# Patient Record
Sex: FEMALE | Race: WHITE | Employment: FULL TIME | URBAN - METROPOLITAN AREA
[De-identification: names, ages, dates, MRNs, and addresses within clinical notes are randomized per-mention and may not be internally consistent; named-entity substitution may affect disease eponyms.]

---

## 2018-08-02 ENCOUNTER — OFFICE VISIT (OUTPATIENT)
Dept: INTERNAL MEDICINE | Age: 38
End: 2018-08-02
Payer: COMMERCIAL

## 2018-08-02 VITALS
SYSTOLIC BLOOD PRESSURE: 102 MMHG | DIASTOLIC BLOOD PRESSURE: 70 MMHG | WEIGHT: 146.6 LBS | HEART RATE: 91 BPM | OXYGEN SATURATION: 98 % | BODY MASS INDEX: 23.01 KG/M2 | HEIGHT: 67 IN

## 2018-08-02 DIAGNOSIS — B96.89 BV (BACTERIAL VAGINOSIS): Primary | ICD-10-CM

## 2018-08-02 DIAGNOSIS — J30.89 ENVIRONMENTAL AND SEASONAL ALLERGIES: ICD-10-CM

## 2018-08-02 DIAGNOSIS — N76.0 BV (BACTERIAL VAGINOSIS): Primary | ICD-10-CM

## 2018-08-02 DIAGNOSIS — E03.9 HYPOTHYROIDISM, UNSPECIFIED TYPE: ICD-10-CM

## 2018-08-02 PROCEDURE — 99203 OFFICE O/P NEW LOW 30 MIN: CPT | Performed by: PHYSICIAN ASSISTANT

## 2018-08-02 RX ORDER — LEVOTHYROXINE SODIUM 88 UG/1
88 TABLET ORAL DAILY
COMMUNITY
End: 2019-02-08 | Stop reason: SDUPTHER

## 2018-08-02 RX ORDER — CLINDAMYCIN PHOSPHATE 20 MG/G
CREAM VAGINAL
Qty: 1 TUBE | Refills: 0 | Status: SHIPPED | OUTPATIENT
Start: 2018-08-02 | End: 2018-11-15 | Stop reason: SDUPTHER

## 2018-08-02 ASSESSMENT — ENCOUNTER SYMPTOMS
COUGH: 0
SHORTNESS OF BREATH: 0
ABDOMINAL PAIN: 0

## 2018-08-02 ASSESSMENT — PATIENT HEALTH QUESTIONNAIRE - PHQ9
1. LITTLE INTEREST OR PLEASURE IN DOING THINGS: 0
SUM OF ALL RESPONSES TO PHQ QUESTIONS 1-9: 0
SUM OF ALL RESPONSES TO PHQ9 QUESTIONS 1 & 2: 0
2. FEELING DOWN, DEPRESSED OR HOPELESS: 0

## 2018-08-02 NOTE — PROGRESS NOTES
record. OBJECTIVE    Vitals:    08/02/18 0845   BP: 102/70   Pulse: 91   SpO2: 98%   Weight: 146 lb 9.6 oz (66.5 kg)   Height: 5' 7\" (1.702 m)       Physical Exam   Constitutional: She is well-developed, well-nourished, and in no distress. HENT:   Head: Normocephalic. Mouth/Throat: Oropharynx is clear and moist.   Eyes: Conjunctivae are normal.   Neck: Normal range of motion. Neck supple. No thyromegaly present. Cardiovascular: Normal rate, regular rhythm and normal heart sounds. Pulmonary/Chest: Effort normal and breath sounds normal.   Abdominal: Soft. Lymphadenopathy:     She has no cervical adenopathy. Neurological: She is alert. Skin: Skin is warm. Psychiatric: Affect normal.         ASSESSMENT/ PLAN    1. BV (bacterial vaginosis)  - will treat today with ABX  - needs to see gyn, for to discuss options for recurrent treatment   - pt wants to discuss the intravaginal boric acid   - Ambulatory referral to Obstetrics / Gynecology  - clindamycin (CLEOCIN) 2 % vaginal cream; Place vaginally nightly x 7 days  Dispense: 1 Tube; Refill: 0    2. Hypothyroidism, unspecified type  - stale on current treatment dose  - continue Synthroid 88 mcg     3.  Environmental and seasonal allergies  - Amb External Referral To Allergy                Electronically signed by:  IBRAHIMA Blanton   8/8/18

## 2018-08-02 NOTE — LETTER
[facility]  [address] [place], [place]        August 2, 2018    [address]  [address] [place] [address]      Dear Carol Wall:                  During your last visit to our office, we recommended a consultation with an allergist.           If you have not already scheduled this please call: [phone] to make that appointment. Dr. Matilde Edmond           Please remember the importance of your health and take time to consult a specialist. If you do not wish to follow through with the consultation, please contact us so we can discuss this matter with you.                   Sincerely,                  IBRAHIMA Bunn

## 2018-08-21 ENCOUNTER — TELEPHONE (OUTPATIENT)
Dept: INTERNAL MEDICINE | Age: 38
End: 2018-08-21

## 2018-08-28 ENCOUNTER — NURSE ONLY (OUTPATIENT)
Dept: INTERNAL MEDICINE | Age: 38
End: 2018-08-28

## 2018-08-28 DIAGNOSIS — R89.4 NONSPECIFIC IMMUNOLOGIC FINDING: Primary | ICD-10-CM

## 2018-08-28 NOTE — PROGRESS NOTES
Pt was here for first set of allergy shots. Pt supplied vials and is leaving them here for her next dose. Pt tolerated well.

## 2018-08-30 ENCOUNTER — OFFICE VISIT (OUTPATIENT)
Dept: OBGYN CLINIC | Age: 38
End: 2018-08-30
Payer: COMMERCIAL

## 2018-08-30 VITALS
DIASTOLIC BLOOD PRESSURE: 66 MMHG | HEART RATE: 76 BPM | WEIGHT: 148 LBS | BODY MASS INDEX: 23.23 KG/M2 | SYSTOLIC BLOOD PRESSURE: 104 MMHG | HEIGHT: 67 IN

## 2018-08-30 DIAGNOSIS — N76.0 VAGINOSIS: Primary | ICD-10-CM

## 2018-08-30 PROCEDURE — 99203 OFFICE O/P NEW LOW 30 MIN: CPT | Performed by: OBSTETRICS & GYNECOLOGY

## 2018-09-03 ASSESSMENT — ENCOUNTER SYMPTOMS
ABDOMINAL PAIN: 0
DIARRHEA: 0
CONSTIPATION: 0

## 2018-09-04 ENCOUNTER — OFFICE VISIT (OUTPATIENT)
Dept: INTERNAL MEDICINE | Age: 38
End: 2018-09-04
Payer: COMMERCIAL

## 2018-09-04 ENCOUNTER — NURSE ONLY (OUTPATIENT)
Dept: INTERNAL MEDICINE | Age: 38
End: 2018-09-04

## 2018-09-04 VITALS
RESPIRATION RATE: 16 BRPM | BODY MASS INDEX: 23.89 KG/M2 | HEART RATE: 61 BPM | DIASTOLIC BLOOD PRESSURE: 70 MMHG | TEMPERATURE: 97.8 F | SYSTOLIC BLOOD PRESSURE: 102 MMHG | OXYGEN SATURATION: 99 % | HEIGHT: 67 IN | WEIGHT: 152.2 LBS

## 2018-09-04 DIAGNOSIS — L30.9 DERMATITIS: Primary | ICD-10-CM

## 2018-09-04 DIAGNOSIS — R89.4 NONSPECIFIC IMMUNOLOGIC FINDING: Primary | ICD-10-CM

## 2018-09-04 PROCEDURE — 99213 OFFICE O/P EST LOW 20 MIN: CPT | Performed by: PHYSICIAN ASSISTANT

## 2018-09-04 RX ORDER — PERMETHRIN 50 MG/G
CREAM TOPICAL
Qty: 1 TUBE | Refills: 0 | Status: SHIPPED | OUTPATIENT
Start: 2018-09-04 | End: 2019-04-04 | Stop reason: ALTCHOICE

## 2018-09-04 RX ORDER — METHYLPREDNISOLONE 4 MG/1
TABLET ORAL
Qty: 1 KIT | Refills: 0 | Status: SHIPPED | OUTPATIENT
Start: 2018-09-04 | End: 2018-09-10

## 2018-09-04 NOTE — PROGRESS NOTES
2018     Bruno Nguyễn (:  1980) is a 41 y.o. female, here for evaluation of the following medical concerns:    Chief Complaint   Patient presents with    Rash     Pt  presents with rash all over body stomach, back, legs and arms. Onset x4 days pt is having itchiness, redness and small bumps. Pt does not know what the rash is from pt has not switched anything in daily life. Pt states her  has the same rash       Rash   This is a new problem. Episode onset: 4 days. The problem has been gradually worsening since onset. The rash is diffuse. The rash is characterized by redness and itchiness. She was exposed to nothing. Pertinent negatives include no cough, facial edema, fatigue, fever, shortness of breath or sore throat. Past treatments include nothing.    has the same rash, thinks that he was exposed to scabies at work         Review of Systems   Constitutional: Negative for chills, fatigue and fever. HENT: Negative for sore throat. Respiratory: Negative for cough and shortness of breath. Musculoskeletal: Negative for arthralgias. Skin: Positive for rash. Prior to Visit Medications    Medication Sig Taking? Authorizing Provider   permethrin (ELIMITE) 5 % cream Apply topically as directed Yes IBRAHIMA Yeager   levothyroxine (SYNTHROID) 88 MCG tablet Take 88 mcg by mouth Daily Yes Historical Provider, MD        Social History   Substance Use Topics    Smoking status: Never Smoker    Smokeless tobacco: Never Used    Alcohol use Yes      Comment: OCCASSIONAL        Vitals:    18 1220   BP: 102/70   Site: Left Arm   Position: Sitting   Cuff Size: Medium Adult   Pulse: 61   Resp: 16   Temp: 97.8 °F (36.6 °C)   TempSrc: Oral   SpO2: 99%   Weight: 152 lb 3.2 oz (69 kg)   Height: 5' 7\" (1.702 m)     Estimated body mass index is 23.84 kg/m² as calculated from the following:    Height as of this encounter: 5' 7\" (1.702 m).     Weight as of this encounter: 152 lb 3.2 oz (69

## 2018-09-11 ENCOUNTER — NURSE ONLY (OUTPATIENT)
Dept: INTERNAL MEDICINE | Age: 38
End: 2018-09-11

## 2018-09-11 DIAGNOSIS — R89.4 NONSPECIFIC IMMUNOLOGIC FINDING: Primary | ICD-10-CM

## 2018-09-16 ASSESSMENT — ENCOUNTER SYMPTOMS
SORE THROAT: 0
COUGH: 0
SHORTNESS OF BREATH: 0

## 2018-09-18 ENCOUNTER — NURSE ONLY (OUTPATIENT)
Dept: INTERNAL MEDICINE | Age: 38
End: 2018-09-18

## 2018-09-18 DIAGNOSIS — R89.4 NONSPECIFIC IMMUNOLOGIC FINDING: Primary | ICD-10-CM

## 2018-09-25 ENCOUNTER — NURSE ONLY (OUTPATIENT)
Dept: INTERNAL MEDICINE | Age: 38
End: 2018-09-25

## 2018-09-25 DIAGNOSIS — R89.4 NONSPECIFIC IMMUNOLOGIC FINDING: Primary | ICD-10-CM

## 2018-10-02 ENCOUNTER — NURSE ONLY (OUTPATIENT)
Dept: INTERNAL MEDICINE | Age: 38
End: 2018-10-02

## 2018-10-02 DIAGNOSIS — R89.4 NONSPECIFIC IMMUNOLOGIC FINDING: Primary | ICD-10-CM

## 2018-10-16 ENCOUNTER — NURSE ONLY (OUTPATIENT)
Dept: INTERNAL MEDICINE | Age: 38
End: 2018-10-16

## 2018-10-16 DIAGNOSIS — R89.4 NONSPECIFIC IMMUNOLOGIC FINDING: Primary | ICD-10-CM

## 2018-10-23 ENCOUNTER — NURSE ONLY (OUTPATIENT)
Dept: INTERNAL MEDICINE | Age: 38
End: 2018-10-23

## 2018-10-23 DIAGNOSIS — R89.4 NONSPECIFIC IMMUNOLOGIC FINDING: Primary | ICD-10-CM

## 2018-10-25 ENCOUNTER — OFFICE VISIT (OUTPATIENT)
Dept: INTERNAL MEDICINE | Age: 38
End: 2018-10-25
Payer: COMMERCIAL

## 2018-10-25 VITALS
SYSTOLIC BLOOD PRESSURE: 100 MMHG | BODY MASS INDEX: 24.14 KG/M2 | DIASTOLIC BLOOD PRESSURE: 60 MMHG | HEIGHT: 67 IN | WEIGHT: 153.8 LBS | OXYGEN SATURATION: 98 % | HEART RATE: 66 BPM | TEMPERATURE: 97 F

## 2018-10-25 DIAGNOSIS — E03.9 HYPOTHYROIDISM, UNSPECIFIED TYPE: ICD-10-CM

## 2018-10-25 DIAGNOSIS — N89.8 VAGINAL DISCHARGE: ICD-10-CM

## 2018-10-25 DIAGNOSIS — Z01.419 WELL WOMAN EXAM WITH ROUTINE GYNECOLOGICAL EXAM: ICD-10-CM

## 2018-10-25 DIAGNOSIS — Z12.4 SCREENING FOR CERVICAL CANCER: ICD-10-CM

## 2018-10-25 DIAGNOSIS — Z01.419 WELL WOMAN EXAM WITH ROUTINE GYNECOLOGICAL EXAM: Primary | ICD-10-CM

## 2018-10-25 LAB
ALBUMIN SERPL-MCNC: 4.4 G/DL (ref 3.9–4.9)
ALP BLD-CCNC: 68 U/L (ref 40–130)
ALT SERPL-CCNC: 9 U/L (ref 0–33)
ANION GAP SERPL CALCULATED.3IONS-SCNC: 12 MEQ/L (ref 7–13)
AST SERPL-CCNC: 15 U/L (ref 0–35)
BASOPHILS ABSOLUTE: 0 K/UL (ref 0–0.2)
BASOPHILS RELATIVE PERCENT: 0.9 %
BILIRUB SERPL-MCNC: 0.7 MG/DL (ref 0–1.2)
BUN BLDV-MCNC: 8 MG/DL (ref 6–20)
CALCIUM SERPL-MCNC: 9.3 MG/DL (ref 8.6–10.2)
CHLORIDE BLD-SCNC: 100 MEQ/L (ref 98–107)
CO2: 26 MEQ/L (ref 22–29)
CREAT SERPL-MCNC: 0.46 MG/DL (ref 0.5–0.9)
EOSINOPHILS ABSOLUTE: 0.1 K/UL (ref 0–0.7)
EOSINOPHILS RELATIVE PERCENT: 1.2 %
GFR AFRICAN AMERICAN: >60
GFR NON-AFRICAN AMERICAN: >60
GLOBULIN: 2.4 G/DL (ref 2.3–3.5)
GLUCOSE BLD-MCNC: 108 MG/DL (ref 74–109)
HCT VFR BLD CALC: 39.1 % (ref 37–47)
HEMOGLOBIN: 13.2 G/DL (ref 12–16)
LYMPHOCYTES ABSOLUTE: 1.5 K/UL (ref 1–4.8)
LYMPHOCYTES RELATIVE PERCENT: 29.6 %
MCH RBC QN AUTO: 29.6 PG (ref 27–31.3)
MCHC RBC AUTO-ENTMCNC: 33.7 % (ref 33–37)
MCV RBC AUTO: 87.8 FL (ref 82–100)
MONOCYTES ABSOLUTE: 0.4 K/UL (ref 0.2–0.8)
MONOCYTES RELATIVE PERCENT: 7.2 %
NEUTROPHILS ABSOLUTE: 3.2 K/UL (ref 1.4–6.5)
NEUTROPHILS RELATIVE PERCENT: 61.1 %
PDW BLD-RTO: 13.8 % (ref 11.5–14.5)
PLATELET # BLD: 215 K/UL (ref 130–400)
POTASSIUM SERPL-SCNC: 3.9 MEQ/L (ref 3.5–5.1)
RBC # BLD: 4.45 M/UL (ref 4.2–5.4)
SODIUM BLD-SCNC: 138 MEQ/L (ref 132–144)
TOTAL PROTEIN: 6.8 G/DL (ref 6.4–8.1)
TSH SERPL DL<=0.05 MIU/L-ACNC: 2.95 UIU/ML (ref 0.27–4.2)
WBC # BLD: 5.2 K/UL (ref 4.8–10.8)

## 2018-10-25 PROCEDURE — 99395 PREV VISIT EST AGE 18-39: CPT | Performed by: PHYSICIAN ASSISTANT

## 2018-10-25 ASSESSMENT — ENCOUNTER SYMPTOMS
COUGH: 0
SHORTNESS OF BREATH: 0
ABDOMINAL PAIN: 0

## 2018-10-25 NOTE — PROGRESS NOTES
10/25/2018    Ivett Sanchez (:  1980) is a 41 y.o. female, here for a preventive medicine evaluation. Patient is a 41year old female who presents today for her pap. Last PAP was normal; about 2 yrs ago  Menses are regular every 28-30 days. Patient's last menstrual period was 10/14/2018 (exact date). She is  single partner, contraception - none. No family history of cervical, uterine, ovarian, or breast cancer. Patient does perform regular self breast exams. She does not have concern for lumps. Patient does have vaginal discharge. She does have vaginal odor. Patient Active Problem List   Diagnosis    BV (bacterial vaginosis)    Hypothyroidism    Nonspecific immunologic finding       Review of Systems   Constitutional: Negative for chills, fatigue and fever. HENT: Negative for congestion. Respiratory: Negative for cough and shortness of breath. Cardiovascular: Negative for chest pain and palpitations. Gastrointestinal: Negative for abdominal pain. Genitourinary: Positive for vaginal discharge. Negative for decreased urine volume, dysuria, frequency, hematuria, menstrual problem, vaginal bleeding and vaginal pain. Neurological: Negative for dizziness and light-headedness. Psychiatric/Behavioral: Negative for agitation and sleep disturbance. Prior to Visit Medications    Medication Sig Taking?  Authorizing Provider   Cholecalciferol (VITAMIN D3) 5000 units TABS Take by mouth Yes Historical Provider, MD   levothyroxine (SYNTHROID) 88 MCG tablet Take 88 mcg by mouth Daily Yes Historical Provider, MD   permethrin (ELIMITE) 5 % cream Apply topically as directed  [address]        Allergies   Allergen Reactions    Metronidazole Rash       Past Medical History:   Diagnosis Date    BV (bacterial vaginosis)     Hypothyroidism        Past Surgical History:   Procedure Laterality Date    BREAST ENHANCEMENT SURGERY  2017    BREAST LUMPECTOMY Right

## 2018-10-28 LAB — GENITAL CULTURE, ROUTINE: NORMAL

## 2018-11-02 LAB
HPV COMMENT: NORMAL
HPV TYPE 16: NOT DETECTED
HPV TYPE 18: NOT DETECTED
HPVOH (OTHER TYPES): NOT DETECTED

## 2018-11-06 ENCOUNTER — NURSE ONLY (OUTPATIENT)
Dept: INTERNAL MEDICINE | Age: 38
End: 2018-11-06

## 2018-11-06 DIAGNOSIS — R89.4 NONSPECIFIC IMMUNOLOGIC FINDING: Primary | ICD-10-CM

## 2018-11-13 ENCOUNTER — NURSE ONLY (OUTPATIENT)
Dept: INTERNAL MEDICINE | Age: 38
End: 2018-11-13

## 2018-11-13 DIAGNOSIS — R89.4 NONSPECIFIC IMMUNOLOGIC FINDING: Primary | ICD-10-CM

## 2018-11-13 NOTE — PROGRESS NOTES
Patient here for allergy injections, patient wait 20 min to make sure there was no reaction, patient tolerated well

## 2018-11-15 ENCOUNTER — OFFICE VISIT (OUTPATIENT)
Dept: OBGYN CLINIC | Age: 38
End: 2018-11-15
Payer: COMMERCIAL

## 2018-11-15 VITALS
DIASTOLIC BLOOD PRESSURE: 62 MMHG | SYSTOLIC BLOOD PRESSURE: 104 MMHG | BODY MASS INDEX: 24.64 KG/M2 | HEIGHT: 67 IN | WEIGHT: 157 LBS

## 2018-11-15 DIAGNOSIS — N76.0 BV (BACTERIAL VAGINOSIS): ICD-10-CM

## 2018-11-15 DIAGNOSIS — N76.0 VAGINOSIS: Primary | ICD-10-CM

## 2018-11-15 DIAGNOSIS — B96.89 BV (BACTERIAL VAGINOSIS): ICD-10-CM

## 2018-11-15 PROCEDURE — 99213 OFFICE O/P EST LOW 20 MIN: CPT | Performed by: OBSTETRICS & GYNECOLOGY

## 2018-11-15 RX ORDER — CLINDAMYCIN PHOSPHATE 20 MG/G
CREAM VAGINAL
Qty: 1 TUBE | Refills: 2 | Status: SHIPPED | OUTPATIENT
Start: 2018-11-15 | End: 2018-11-22

## 2018-11-15 ASSESSMENT — ENCOUNTER SYMPTOMS
SHORTNESS OF BREATH: 0
APNEA: 0
DIARRHEA: 0
CONSTIPATION: 0
ABDOMINAL PAIN: 0

## 2018-11-15 NOTE — PROGRESS NOTES
The patient was asked if she would like a chaperone present for her intimate exam. She  Declined the chaperone.  Amy Jang

## 2018-11-27 ENCOUNTER — NURSE ONLY (OUTPATIENT)
Dept: INTERNAL MEDICINE | Age: 38
End: 2018-11-27

## 2018-11-27 DIAGNOSIS — R89.4 NONSPECIFIC IMMUNOLOGIC FINDING: Primary | ICD-10-CM

## 2018-12-04 ENCOUNTER — NURSE ONLY (OUTPATIENT)
Dept: INTERNAL MEDICINE | Age: 38
End: 2018-12-04

## 2018-12-04 DIAGNOSIS — R89.4 NONSPECIFIC IMMUNOLOGIC FINDING: Primary | ICD-10-CM

## 2018-12-13 ENCOUNTER — NURSE ONLY (OUTPATIENT)
Dept: INTERNAL MEDICINE | Age: 38
End: 2018-12-13

## 2018-12-13 DIAGNOSIS — R89.4 NONSPECIFIC IMMUNOLOGIC FINDING: Primary | ICD-10-CM

## 2018-12-18 ENCOUNTER — NURSE ONLY (OUTPATIENT)
Dept: INTERNAL MEDICINE | Age: 38
End: 2018-12-18

## 2018-12-18 DIAGNOSIS — R89.4 NONSPECIFIC IMMUNOLOGIC FINDING: Primary | ICD-10-CM

## 2018-12-26 ENCOUNTER — NURSE ONLY (OUTPATIENT)
Dept: INTERNAL MEDICINE | Age: 38
End: 2018-12-26

## 2018-12-26 DIAGNOSIS — R89.4 NONSPECIFIC IMMUNOLOGIC FINDING: Primary | ICD-10-CM

## 2019-01-08 ENCOUNTER — NURSE ONLY (OUTPATIENT)
Dept: INTERNAL MEDICINE | Age: 39
End: 2019-01-08

## 2019-01-08 DIAGNOSIS — R89.4 NONSPECIFIC IMMUNOLOGIC FINDING: Primary | ICD-10-CM

## 2019-01-18 ENCOUNTER — NURSE ONLY (OUTPATIENT)
Dept: INTERNAL MEDICINE | Age: 39
End: 2019-01-18

## 2019-01-18 DIAGNOSIS — R89.4 NONSPECIFIC IMMUNOLOGIC FINDING: Primary | ICD-10-CM

## 2019-01-23 ENCOUNTER — NURSE ONLY (OUTPATIENT)
Dept: INTERNAL MEDICINE | Age: 39
End: 2019-01-23

## 2019-01-23 DIAGNOSIS — R89.4 NONSPECIFIC IMMUNOLOGIC FINDING: Primary | ICD-10-CM

## 2019-01-29 ENCOUNTER — NURSE ONLY (OUTPATIENT)
Dept: INTERNAL MEDICINE | Age: 39
End: 2019-01-29

## 2019-01-29 DIAGNOSIS — R89.4 NONSPECIFIC IMMUNOLOGIC FINDING: Primary | ICD-10-CM

## 2019-02-08 ENCOUNTER — NURSE ONLY (OUTPATIENT)
Dept: INTERNAL MEDICINE | Age: 39
End: 2019-02-08

## 2019-02-08 DIAGNOSIS — R89.4 NONSPECIFIC IMMUNOLOGIC FINDING: Primary | ICD-10-CM

## 2019-02-08 RX ORDER — LEVOTHYROXINE SODIUM 88 UG/1
88 TABLET ORAL DAILY
Qty: 90 TABLET | Refills: 3 | Status: SHIPPED | OUTPATIENT
Start: 2019-02-08 | End: 2019-05-14 | Stop reason: SDUPTHER

## 2019-02-15 ENCOUNTER — NURSE ONLY (OUTPATIENT)
Dept: INTERNAL MEDICINE | Age: 39
End: 2019-02-15

## 2019-02-15 DIAGNOSIS — R89.4 NONSPECIFIC IMMUNOLOGIC FINDING: Primary | ICD-10-CM

## 2019-02-21 ENCOUNTER — NURSE ONLY (OUTPATIENT)
Dept: INTERNAL MEDICINE | Age: 39
End: 2019-02-21

## 2019-02-21 DIAGNOSIS — R89.4 NONSPECIFIC IMMUNOLOGIC FINDING: Primary | ICD-10-CM

## 2019-03-01 ENCOUNTER — NURSE ONLY (OUTPATIENT)
Dept: INTERNAL MEDICINE | Age: 39
End: 2019-03-01

## 2019-03-01 DIAGNOSIS — R89.4 NONSPECIFIC IMMUNOLOGIC FINDING: Primary | ICD-10-CM

## 2019-03-08 ENCOUNTER — NURSE ONLY (OUTPATIENT)
Dept: INTERNAL MEDICINE | Age: 39
End: 2019-03-08

## 2019-03-21 ENCOUNTER — NURSE ONLY (OUTPATIENT)
Dept: INTERNAL MEDICINE | Age: 39
End: 2019-03-21

## 2019-03-21 DIAGNOSIS — J30.89 ENVIRONMENTAL AND SEASONAL ALLERGIES: Primary | ICD-10-CM

## 2019-03-29 ENCOUNTER — NURSE ONLY (OUTPATIENT)
Dept: INTERNAL MEDICINE | Age: 39
End: 2019-03-29

## 2019-03-29 DIAGNOSIS — R89.4 NONSPECIFIC IMMUNOLOGIC FINDING: Primary | ICD-10-CM

## 2019-04-04 ENCOUNTER — OFFICE VISIT (OUTPATIENT)
Dept: INTERNAL MEDICINE | Age: 39
End: 2019-04-04
Payer: COMMERCIAL

## 2019-04-04 VITALS
BODY MASS INDEX: 24.01 KG/M2 | TEMPERATURE: 97.6 F | OXYGEN SATURATION: 99 % | HEIGHT: 67 IN | DIASTOLIC BLOOD PRESSURE: 68 MMHG | HEART RATE: 59 BPM | SYSTOLIC BLOOD PRESSURE: 118 MMHG | WEIGHT: 153 LBS

## 2019-04-04 DIAGNOSIS — E03.9 HYPOTHYROIDISM, UNSPECIFIED TYPE: ICD-10-CM

## 2019-04-04 DIAGNOSIS — Z00.00 ANNUAL PHYSICAL EXAM: Primary | ICD-10-CM

## 2019-04-04 PROCEDURE — 99395 PREV VISIT EST AGE 18-39: CPT | Performed by: PHYSICIAN ASSISTANT

## 2019-04-04 ASSESSMENT — PATIENT HEALTH QUESTIONNAIRE - PHQ9
2. FEELING DOWN, DEPRESSED OR HOPELESS: 0
SUM OF ALL RESPONSES TO PHQ QUESTIONS 1-9: 0
1. LITTLE INTEREST OR PLEASURE IN DOING THINGS: 0
SUM OF ALL RESPONSES TO PHQ9 QUESTIONS 1 & 2: 0
SUM OF ALL RESPONSES TO PHQ QUESTIONS 1-9: 0

## 2019-04-04 ASSESSMENT — ENCOUNTER SYMPTOMS
ABDOMINAL PAIN: 0
CHEST TIGHTNESS: 0
SHORTNESS OF BREATH: 0
BACK PAIN: 0
COUGH: 0

## 2019-04-04 NOTE — PROGRESS NOTES
on file    Years of education: Not on file    Highest education level: Not on file   Occupational History    Not on file   Social Needs    Financial resource strain: Not on file    Food insecurity:     Worry: Not on file     Inability: Not on file    Transportation needs:     Medical: Not on file     Non-medical: Not on file   Tobacco Use    Smoking status: Never Smoker    Smokeless tobacco: Never Used   Substance and Sexual Activity    Alcohol use: Yes     Comment: OCCASSIONAL    Drug use: No    Sexual activity: Yes     Partners: Male   Lifestyle    Physical activity:     Days per week: Not on file     Minutes per session: Not on file    Stress: Not on file   Relationships    Social connections:     Talks on phone: Not on file     Gets together: Not on file     Attends Restorationism service: Not on file     Active member of club or organization: Not on file     Attends meetings of clubs or organizations: Not on file     Relationship status: Not on file    Intimate partner violence:     Fear of current or ex partner: Not on file     Emotionally abused: Not on file     Physically abused: Not on file     Forced sexual activity: Not on file   Other Topics Concern    Not on file   Social History Narrative    Not on file        No family history on file. ADVANCE DIRECTIVE: N, Not Received    Vitals:    04/04/19 1312   BP: 118/68   Site: Right Upper Arm   Position: Sitting   Cuff Size: Large Adult   Pulse: 59   Temp: 97.6 °F (36.4 °C)   TempSrc: Temporal   SpO2: 99%   Weight: 153 lb (69.4 kg)   Height: 5' 7\" (1.702 m)     Estimated body mass index is 23.96 kg/m² as calculated from the following:    Height as of this encounter: 5' 7\" (1.702 m). Weight as of this encounter: 153 lb (69.4 kg). Physical Exam   Constitutional: She appears well-developed and well-nourished. HENT:   Head: Normocephalic and atraumatic.    Right Ear: External ear normal.   Left Ear: External ear normal.   Nose: Nose normal. Mouth/Throat: Oropharynx is clear and moist.   Eyes: Pupils are equal, round, and reactive to light. Conjunctivae are normal.   Neck: Normal range of motion. Neck supple. No thyromegaly present. Cardiovascular: Normal rate, regular rhythm and normal heart sounds. Pulmonary/Chest: Effort normal and breath sounds normal.   Abdominal: Soft. Bowel sounds are normal.   Musculoskeletal: Normal range of motion. Lymphadenopathy:     She has no cervical adenopathy. Neurological: She is alert. Skin: Skin is warm and dry. Psychiatric: She has a normal mood and affect. Her behavior is normal. Thought content normal.       No flowsheet data found. Lab Results   Component Value Date    GLUCOSE 108 10/25/2018       The ASCVD Risk score (Tammy Hollins., et al., 2013) failed to calculate for the following reasons: The 2013 ASCVD risk score is only valid for ages 36 to 78    Immunization History   Administered Date(s) Administered    Tdap (Boostrix, Adacel) 08/02/2017       Health Maintenance   Topic Date Due    Varicella Vaccine (1 of 2 - 13+ 2-dose series) 01/21/1995    HIV screen  01/21/1997    Flu vaccine (Season Ended) 09/01/2019    TSH testing  10/25/2019    Cervical cancer screen  10/25/2023    DTaP/Tdap/Td vaccine (2 - Td) 08/02/2027       ASSESSMENT/PLAN:  1. Annual physical exam  2. Hypothyroidism, unspecified type  - no abd findings  - last TSH done 10/2018 was normal       No follow-ups on file. An electronic signature was used to authenticate this note.     --IBRAHIMA Bueno on 4/5/2019 at 3:05 PM

## 2019-04-05 ASSESSMENT — ENCOUNTER SYMPTOMS
SINUS PRESSURE: 0
SINUS PAIN: 0
TROUBLE SWALLOWING: 0

## 2019-04-11 ENCOUNTER — NURSE ONLY (OUTPATIENT)
Dept: INTERNAL MEDICINE | Age: 39
End: 2019-04-11

## 2019-04-11 DIAGNOSIS — R89.4 NONSPECIFIC IMMUNOLOGIC FINDING: Primary | ICD-10-CM

## 2019-04-19 ENCOUNTER — NURSE ONLY (OUTPATIENT)
Dept: INTERNAL MEDICINE | Age: 39
End: 2019-04-19

## 2019-04-19 DIAGNOSIS — R89.4 NONSPECIFIC IMMUNOLOGIC FINDING: Primary | ICD-10-CM

## 2019-04-26 ENCOUNTER — NURSE ONLY (OUTPATIENT)
Dept: INTERNAL MEDICINE | Age: 39
End: 2019-04-26

## 2019-04-26 DIAGNOSIS — R89.4 NONSPECIFIC IMMUNOLOGIC FINDING: Primary | ICD-10-CM

## 2019-05-03 ENCOUNTER — NURSE ONLY (OUTPATIENT)
Dept: INTERNAL MEDICINE | Age: 39
End: 2019-05-03

## 2019-05-03 DIAGNOSIS — R89.4 NONSPECIFIC IMMUNOLOGIC FINDING: Primary | ICD-10-CM

## 2019-05-14 ENCOUNTER — NURSE ONLY (OUTPATIENT)
Dept: INTERNAL MEDICINE | Age: 39
End: 2019-05-14

## 2019-05-14 DIAGNOSIS — R89.4 NONSPECIFIC IMMUNOLOGIC FINDING: Primary | ICD-10-CM

## 2019-05-14 RX ORDER — LEVOTHYROXINE SODIUM 88 UG/1
88 TABLET ORAL DAILY
Qty: 90 TABLET | Refills: 3 | Status: SHIPPED | OUTPATIENT
Start: 2019-05-14 | End: 2020-06-12 | Stop reason: SDUPTHER

## 2019-05-24 ENCOUNTER — NURSE ONLY (OUTPATIENT)
Dept: INTERNAL MEDICINE | Age: 39
End: 2019-05-24

## 2019-05-24 DIAGNOSIS — R89.4 NONSPECIFIC IMMUNOLOGIC FINDING: Primary | ICD-10-CM

## 2019-06-24 ENCOUNTER — NURSE ONLY (OUTPATIENT)
Dept: INTERNAL MEDICINE | Age: 39
End: 2019-06-24

## 2019-06-24 DIAGNOSIS — J30.89 ENVIRONMENTAL AND SEASONAL ALLERGIES: Primary | ICD-10-CM

## 2019-06-24 DIAGNOSIS — R89.4 NONSPECIFIC IMMUNOLOGIC FINDING: ICD-10-CM

## 2019-07-02 ENCOUNTER — NURSE ONLY (OUTPATIENT)
Dept: INTERNAL MEDICINE | Age: 39
End: 2019-07-02

## 2019-07-02 DIAGNOSIS — R89.4 NONSPECIFIC IMMUNOLOGIC FINDING: Primary | ICD-10-CM

## 2019-07-09 ENCOUNTER — NURSE ONLY (OUTPATIENT)
Dept: INTERNAL MEDICINE | Age: 39
End: 2019-07-09

## 2019-07-09 DIAGNOSIS — R89.4 NONSPECIFIC IMMUNOLOGIC FINDING: Primary | ICD-10-CM

## 2019-07-19 ENCOUNTER — NURSE ONLY (OUTPATIENT)
Dept: INTERNAL MEDICINE | Age: 39
End: 2019-07-19

## 2019-07-19 DIAGNOSIS — R89.4 NONSPECIFIC IMMUNOLOGIC FINDING: Primary | ICD-10-CM

## 2019-07-24 ENCOUNTER — NURSE ONLY (OUTPATIENT)
Dept: INTERNAL MEDICINE | Age: 39
End: 2019-07-24

## 2019-07-24 DIAGNOSIS — R89.4 NONSPECIFIC IMMUNOLOGIC FINDING: Primary | ICD-10-CM

## 2019-08-02 ENCOUNTER — NURSE ONLY (OUTPATIENT)
Dept: INTERNAL MEDICINE | Age: 39
End: 2019-08-02

## 2019-08-02 DIAGNOSIS — R89.4 NONSPECIFIC IMMUNOLOGIC FINDING: Primary | ICD-10-CM

## 2019-08-21 ENCOUNTER — NURSE ONLY (OUTPATIENT)
Dept: INTERNAL MEDICINE | Age: 39
End: 2019-08-21

## 2019-08-21 DIAGNOSIS — J30.89 ENVIRONMENTAL AND SEASONAL ALLERGIES: Primary | ICD-10-CM

## 2019-08-21 NOTE — PROGRESS NOTES
Patient here for allergy injections. Patient tolerated well. Patient waited 20 mins to make sure no reaction then left.

## 2019-08-29 ENCOUNTER — TELEPHONE (OUTPATIENT)
Dept: INTERNAL MEDICINE | Age: 39
End: 2019-08-29

## 2019-10-03 ENCOUNTER — OFFICE VISIT (OUTPATIENT)
Dept: INTERNAL MEDICINE | Age: 39
End: 2019-10-03
Payer: COMMERCIAL

## 2019-10-03 VITALS
DIASTOLIC BLOOD PRESSURE: 64 MMHG | SYSTOLIC BLOOD PRESSURE: 100 MMHG | OXYGEN SATURATION: 99 % | HEART RATE: 77 BPM | BODY MASS INDEX: 23.65 KG/M2 | WEIGHT: 151 LBS

## 2019-10-03 DIAGNOSIS — E03.9 HYPOTHYROIDISM, UNSPECIFIED TYPE: ICD-10-CM

## 2019-10-03 DIAGNOSIS — Z23 NEED FOR IMMUNIZATION AGAINST INFLUENZA: ICD-10-CM

## 2019-10-03 DIAGNOSIS — M77.8 RIGHT WRIST TENDINITIS: Primary | ICD-10-CM

## 2019-10-03 LAB — TSH SERPL DL<=0.05 MIU/L-ACNC: 2.34 UIU/ML (ref 0.44–3.86)

## 2019-10-03 PROCEDURE — 90688 IIV4 VACCINE SPLT 0.5 ML IM: CPT | Performed by: PHYSICIAN ASSISTANT

## 2019-10-03 PROCEDURE — 99213 OFFICE O/P EST LOW 20 MIN: CPT | Performed by: PHYSICIAN ASSISTANT

## 2019-10-03 PROCEDURE — 90471 IMMUNIZATION ADMIN: CPT | Performed by: PHYSICIAN ASSISTANT

## 2019-10-03 ASSESSMENT — ENCOUNTER SYMPTOMS: COLOR CHANGE: 0

## 2020-04-01 ENCOUNTER — VIRTUAL VISIT (OUTPATIENT)
Dept: INTERNAL MEDICINE | Age: 40
End: 2020-04-01
Payer: COMMERCIAL

## 2020-04-01 PROCEDURE — 99213 OFFICE O/P EST LOW 20 MIN: CPT | Performed by: PHYSICIAN ASSISTANT

## 2020-04-01 RX ORDER — FLUCONAZOLE 150 MG/1
TABLET ORAL
Qty: 2 TABLET | Refills: 0 | Status: SHIPPED | OUTPATIENT
Start: 2020-04-01 | End: 2020-04-17

## 2020-04-01 ASSESSMENT — PATIENT HEALTH QUESTIONNAIRE - PHQ9
SUM OF ALL RESPONSES TO PHQ QUESTIONS 1-9: 0
SUM OF ALL RESPONSES TO PHQ QUESTIONS 1-9: 0
1. LITTLE INTEREST OR PLEASURE IN DOING THINGS: 0
2. FEELING DOWN, DEPRESSED OR HOPELESS: 0
SUM OF ALL RESPONSES TO PHQ9 QUESTIONS 1 & 2: 0

## 2020-04-01 ASSESSMENT — ENCOUNTER SYMPTOMS: ABDOMINAL PAIN: 0

## 2020-04-17 ENCOUNTER — VIRTUAL VISIT (OUTPATIENT)
Dept: INTERNAL MEDICINE | Age: 40
End: 2020-04-17
Payer: COMMERCIAL

## 2020-04-17 PROCEDURE — 99213 OFFICE O/P EST LOW 20 MIN: CPT | Performed by: PHYSICIAN ASSISTANT

## 2020-04-17 RX ORDER — NORGESTIMATE AND ETHINYL ESTRADIOL 0.25-0.035
1 KIT ORAL DAILY
Qty: 3 PACKET | Refills: 3 | Status: SHIPPED | OUTPATIENT
Start: 2020-04-17

## 2020-04-17 NOTE — PROGRESS NOTES
2020    TELEHEALTH EVALUATION -- Audio/Visual (During NTCEJ-14 public health emergency)    Due to Silvana 19 outbreak, patient's office visit was converted to a virtual visit. Patient was contacted and agreed to proceed with a virtual visit via [employer]  The risks and benefits of converting to a virtual visit were discussed in light of the current infectious disease epidemic. Patient also understood that insurance coverage and co-pays are up to their individual insurance plans. HPI:    Gerardotiana Villagomez (:  1980) has requested an audio/video evaluation for the following concern(s):    Chief Complaint   Patient presents with    Contraception     due any day now for her cycle. Birth control   hasn't used it in 10 years   Patient's last menstrual period was 2020. missed periods , no birth control now   States no chance of pregnancy   PAP is UTD      Review of Systems   Constitutional: Negative for chills and fever. Genitourinary: Negative for decreased urine volume, dysuria, flank pain, menstrual problem, pelvic pain, vaginal bleeding, vaginal discharge and vaginal pain. Prior to Visit Medications    Medication Sig Taking? Authorizing Provider   norgestimate-ethinyl estradiol ([id number] [id number]) 0.25-35 MG-MCG per tablet Take 1 tablet by mouth daily Yes IBRAHIMA Gar   levothyroxine (SYNTHROID) 88 MCG tablet Take 1 tablet by mouth Daily Yes IBRAHIMA Gar       Social History     Tobacco Use    Smoking status: Never Smoker    Smokeless tobacco: Never Used   Substance Use Topics    Alcohol use: Yes     Comment: OCCASSIONAL    Drug use:  No            PHYSICAL EXAMINATION:  [ INSTRUCTIONS:  \"[x]\" Indicates a positive item  \"[]\" Indicates a negative item  -- DELETE ALL ITEMS NOT EXAMINED]  [x] Alert  [x] Oriented to person/place/time    [x] No apparent distress  [] Toxic appearing    [] Face flushed appearing [] Sclera clear  [] Lips are cyanotic      [x] Breathing appears normal [] Appears tachypneic      [] Rash on visible skin    [] Cranial Nerves II-XII grossly intact    [] Motor grossly intact in visible upper extremities    [] Motor grossly intact in visible lower extremities    [x] Normal Mood  [] Anxious appearing    [] Depressed appearing  [] Confused appearing      [] Poor short term memory  [] Poor long term memory    [] OTHER:      Due to this being a TeleHealth encounter, evaluation of the following organ systems is limited: Vitals/Constitutional/EENT/Resp/CV/GI//MS/Neuro/Skin/Heme-Lymph-Imm. ASSESSMENT/PLAN:  1. Encounter for initial prescription of contraceptive pills  - norgestimate-ethinyl estradiol ([facility] [id number]) 0.25-35 MG-MCG per tablet; Take 1 tablet by mouth daily  Dispense: 3 packet; Refill: 3      No follow-ups on file. An  electronic signature was used to authenticate this note. --IBRAHIMA Rangel on 4/17/2020 at 2:36 PM        Pursuant to the emergency declaration under the [facility], [facility] waiver authority and the [employer] Act, this Virtual  Visit was conducted, with patient's consent, to reduce the patient's risk of exposure to COVID-19 and provide continuity of care for an established patient. Services were provided through a video synchronous discussion virtually to substitute for in-person clinic visit.

## 2020-06-12 ENCOUNTER — TELEPHONE (OUTPATIENT)
Dept: ADMINISTRATIVE | Age: 40
End: 2020-06-12

## 2020-06-16 RX ORDER — LEVOTHYROXINE SODIUM 88 UG/1
88 TABLET ORAL DAILY
Qty: 90 TABLET | Refills: 2 | Status: SHIPPED | OUTPATIENT
Start: 2020-06-16 | End: 2021-03-26

## 2021-03-26 RX ORDER — LEVOTHYROXINE SODIUM 88 UG/1
88 TABLET ORAL DAILY
Qty: 30 TABLET | Refills: 0 | Status: SHIPPED | OUTPATIENT
Start: 2021-03-26

## 2021-03-26 NOTE — TELEPHONE ENCOUNTER
Requesting medication refill. Please approve or deny this request.    Rx requested:  Requested Prescriptions     Pending Prescriptions Disp Refills    levothyroxine (SYNTHROID) 88 MCG tablet [Pharmacy Med Name: levothyroxine 88 mcg tablet] 90 tablet 2     Sig: Take 1 tablet by mouth Daily       Last Office Visit, reason seen and by who:   4/17/2020 initial prescription IBRAHIMA Henning      FOLLOW UP PLAN FROM LAST VISIT: COPY AND PASTE FROM LAST NOTE        None      PATIENT CONTACTED FOR A FOLLOW UP APPT:     Yes, spoke with patient 03/26/2021 10:22 am, informed her that I would request a 30 day  0 refills, that an appointment is needed for more. Patient stated that she would call back to schedule an appointment. See below    Next Visit Date:  No future appointments.

## 2024-04-29 ENCOUNTER — APPOINTMENT (OUTPATIENT)
Dept: PRIMARY CARE | Facility: CLINIC | Age: 44
End: 2024-04-29

## 2025-07-14 ENCOUNTER — HOSPITAL ENCOUNTER (OUTPATIENT)
Dept: HOSPITAL 100 - RAD | Age: 45
Discharge: HOME | End: 2025-07-14
Payer: COMMERCIAL

## 2025-07-14 DIAGNOSIS — K21.9: Primary | ICD-10-CM

## 2025-07-14 PROCEDURE — 74221 X-RAY XM ESOPHAGUS 2CNTRST: CPT
